# Patient Record
Sex: FEMALE | Race: WHITE
[De-identification: names, ages, dates, MRNs, and addresses within clinical notes are randomized per-mention and may not be internally consistent; named-entity substitution may affect disease eponyms.]

---

## 2021-09-17 ENCOUNTER — HOSPITAL ENCOUNTER (INPATIENT)
Dept: HOSPITAL 79 - ER1 | Age: 51
LOS: 9 days | Discharge: HOME HEALTH | DRG: 492 | End: 2021-09-26
Attending: INTERNAL MEDICINE | Admitting: HOSPITALIST
Payer: COMMERCIAL

## 2021-09-17 VITALS — WEIGHT: 219 LBS | BODY MASS INDEX: 38.8 KG/M2 | HEIGHT: 63 IN

## 2021-09-17 DIAGNOSIS — N30.00: ICD-10-CM

## 2021-09-17 DIAGNOSIS — E87.1: ICD-10-CM
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
## 2021-09-17 DIAGNOSIS — S82.492A: Primary | ICD-10-CM

## 2021-09-17 DIAGNOSIS — J44.0: ICD-10-CM

## 2021-09-17 DIAGNOSIS — N18.9: ICD-10-CM

## 2021-09-17 DIAGNOSIS — S32.000A: ICD-10-CM

## 2021-09-17 DIAGNOSIS — G93.41: ICD-10-CM

## 2021-09-17 DIAGNOSIS — A41.51: ICD-10-CM

## 2021-09-17 DIAGNOSIS — Z79.899: ICD-10-CM

## 2021-09-17 DIAGNOSIS — E87.6: ICD-10-CM

## 2021-09-17 DIAGNOSIS — E66.9: ICD-10-CM

## 2021-09-17 DIAGNOSIS — N17.9: ICD-10-CM

## 2021-09-17 DIAGNOSIS — J18.9: ICD-10-CM
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
## 2021-09-17 DIAGNOSIS — Z98.890: ICD-10-CM

## 2021-09-17 DIAGNOSIS — Z20.822: ICD-10-CM

## 2021-09-17 DIAGNOSIS — S22.050A: ICD-10-CM

## 2021-09-17 DIAGNOSIS — I12.9: ICD-10-CM
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
## 2021-09-17 DIAGNOSIS — S82.202A: ICD-10-CM

## 2021-09-17 DIAGNOSIS — G47.00: ICD-10-CM

## 2021-09-17 DIAGNOSIS — F05: ICD-10-CM

## 2021-09-17 DIAGNOSIS — F17.210: ICD-10-CM

## 2021-09-17 DIAGNOSIS — E78.5: ICD-10-CM

## 2021-09-17 LAB
BUN/CREATININE RATIO: 17 (ref 0–10)
HGB BLD-MCNC: 11.8 GM/DL (ref 12.3–15.3)
RED BLOOD COUNT: 3.84 M/UL (ref 4–5.1)
WHITE BLOOD COUNT: 9.3 K/UL (ref 4.5–11)

## 2021-09-17 PROCEDURE — C1713 ANCHOR/SCREW BN/BN,TIS/BN: HCPCS

## 2021-09-17 PROCEDURE — U0002 COVID-19 LAB TEST NON-CDC: HCPCS

## 2021-09-18 LAB
BUN/CREATININE RATIO: 19 (ref 0–10)
ESCHERICHIA COLI: DETECTED
HGB BLD-MCNC: 10.1 GM/DL (ref 12.3–15.3)
RED BLOOD COUNT: 3.37 M/UL (ref 4–5.1)
WHITE BLOOD COUNT: 9.8 K/UL (ref 4.5–11)

## 2021-09-18 PROCEDURE — 0QSH04Z REPOSITION LEFT TIBIA WITH INTERNAL FIXATION DEVICE, OPEN APPROACH: ICD-10-PCS | Performed by: GENERAL PRACTICE

## 2021-09-18 PROCEDURE — 0QSK04Z REPOSITION LEFT FIBULA WITH INTERNAL FIXATION DEVICE, OPEN APPROACH: ICD-10-PCS | Performed by: GENERAL PRACTICE

## 2021-09-19 LAB
BUN/CREATININE RATIO: 24 (ref 0–10)
HGB BLD-MCNC: 9.7 GM/DL (ref 12.3–15.3)
RED BLOOD COUNT: 3.22 M/UL (ref 4–5.1)
WHITE BLOOD COUNT: 9.3 K/UL (ref 4.5–11)

## 2021-09-19 NOTE — NUR
pt being combative with staff.  pulling at f/c and ivs.  trying to climb out
of bed. notified dr small and recieved orders.  will continue to monitor.

## 2021-09-19 NOTE — NUR
PT TRYING TO CLIMB OUT OF BED AND PULLING AT IV LINES AND F/C.  NOTIFIED DR BARDALES AND RECIEVED ORDERS.  WILL CONTINUE TO MONITOR

## 2021-09-19 NOTE — NUR
PATIENT WAS ARGUING WITH HER  AND TRYING TO GET UP UNASSISTED WHEN HE
WAS TRYING TO LEAVE. SHE WANTED SOMEONE TO STAY WITH HER BUT NO FAMILY WAS
AVAILABLE. SHE WAS PULLING AT HER IV  AND CRYING THAT SHE COULDN'T REST AND
THAT SHE NEEDED TO LEAVE. I GAVE THE PERSCRIBED DOSE OF HALDOL, AND NOTIFIED
THE PROVIDER PER THE DOSING INSTRUCTIONS. WILL CONTINUE TO MONITOR.

## 2021-09-20 LAB
BUN/CREATININE RATIO: 27 (ref 0–10)
HGB BLD-MCNC: 9.5 GM/DL (ref 12.3–15.3)
RED BLOOD COUNT: 3.18 M/UL (ref 4–5.1)
WHITE BLOOD COUNT: 9.7 K/UL (ref 4.5–11)

## 2021-09-20 NOTE — NUR
PT'S SPOUSE TO FLOOR.  RESTRAINTS REMOVED.  PT FREE OF S/SX OF INJURY OR
DISTRESS.  SITTER PRESENT AT BEDSIDE.  WILL CONTINUE TO MONITOR.

## 2021-09-20 NOTE — NUR
PT PULLING AT IV LINES AND F/C.  TRYING TO CLIMB OUT OF BED. SITTER PRESENT AT
BEDSIDE.  NOTIFIED DR ARANDA AND RECIEVED ORDERS.  WILL CONTINUE TO MONITOR.

## 2021-09-20 NOTE — NUR
NOTIFIED DR ARANDA THAT PT RECIEVED 2MG HALDOL AS ORDERED.  RECIEVED NO NEW
ORDERS.  WILL CONTINUE TO MONITOR.

## 2021-09-21 LAB
BUN/CREATININE RATIO: 28 (ref 0–10)
HGB BLD-MCNC: 9.9 GM/DL (ref 12.3–15.3)
RED BLOOD COUNT: 3.31 M/UL (ref 4–5.1)
WHITE BLOOD COUNT: 9.8 K/UL (ref 4.5–11)

## 2021-09-21 NOTE — NUR
PATIENT STILL COMBATIVE AND CONFUSED. UNABLE TO RECIEVE IV THERAPY DUE TO
PULLING AGAINST RESTRAINTS OCCLUDES FLOW OF MEDICATIONS CAUSING PUMP TO STOP.
CALLED DR. LEYVA TO INFORM HIM OF SITUATION WITH PAIENT. NURSE SUGGESTED
PATIENT BE MOVED TO A FLOOR WITH HIGHER ACCUITY FOR SEDATION SO SHE COULD
RECIEVE NECESSARY IV MEDIATIONS. DR. LEYVA SUGESTED GIVING ANOTHER DOSE OF
HALDOL THEN CALLING HIM ONE HOUR POST. HALDOL WAS GIVEN AND PROVIDER WAS
CALLED ONE HOUR POST. NO ANSWER. MESSAGE WITH CALL BACK INFO LEFT. WILL
CONTINUE TO MONITOR.

## 2021-09-22 LAB
BUN/CREATININE RATIO: 25 (ref 0–10)
HGB BLD-MCNC: 9.6 GM/DL (ref 12.3–15.3)
RED BLOOD COUNT: 3.24 M/UL (ref 4–5.1)
WHITE BLOOD COUNT: 11 K/UL (ref 4.5–11)

## 2021-09-23 LAB
BUN/CREATININE RATIO: 21 (ref 0–10)
HGB BLD-MCNC: 9.2 GM/DL (ref 12.3–15.3)
RED BLOOD COUNT: 3.16 M/UL (ref 4–5.1)
WHITE BLOOD COUNT: 12.6 K/UL (ref 4.5–11)

## 2021-09-24 LAB
BUN/CREATININE RATIO: 18 (ref 0–10)
HGB BLD-MCNC: 9.8 GM/DL (ref 12.3–15.3)
RED BLOOD COUNT: 3.28 M/UL (ref 4–5.1)
WHITE BLOOD COUNT: 11.7 K/UL (ref 4.5–11)

## 2021-09-25 LAB
BUN/CREATININE RATIO: 14 (ref 0–10)
HGB BLD-MCNC: 10.6 GM/DL (ref 12.3–15.3)
RED BLOOD COUNT: 3.61 M/UL (ref 4–5.1)
WHITE BLOOD COUNT: 10.4 K/UL (ref 4.5–11)

## 2021-09-26 LAB
BUN/CREATININE RATIO: 13 (ref 0–10)
HGB BLD-MCNC: 10.8 GM/DL (ref 12.3–15.3)
RED BLOOD COUNT: 3.68 M/UL (ref 4–5.1)
WHITE BLOOD COUNT: 11.1 K/UL (ref 4.5–11)

## 2021-09-26 NOTE — NUR
ATTEMPTED TO CALL [facility] TO SET UP SERVICES FOR PT. THERE WAS NO
ANSWER TODAY. NAIN HENRY RN (HOUSE SUPERVISOR) SAID TO PUT IN A CASE
MANAGEMENT CONSULT AND TO FOLLOW UP TOMORROW WHEN I AM HERE. PT. DOES NOT NEED
NURSING CARE...JUST PT....NO ANTIBIOTICS OR OTHER MEDS EITHER. NAIN SAID IT
WAS OK TO LET THE PATIENT GO HOME THAT CASE MANAGEMENT COULD HELP SET UP
SERVICES TOMORROW.